# Patient Record
Sex: MALE | Race: BLACK OR AFRICAN AMERICAN
[De-identification: names, ages, dates, MRNs, and addresses within clinical notes are randomized per-mention and may not be internally consistent; named-entity substitution may affect disease eponyms.]

---

## 2018-02-22 ENCOUNTER — HOSPITAL ENCOUNTER (EMERGENCY)
Dept: HOSPITAL 72 - EMR | Age: 38
Discharge: HOME | End: 2018-02-22
Payer: COMMERCIAL

## 2018-02-22 VITALS — BODY MASS INDEX: 21.19 KG/M2 | WEIGHT: 148 LBS | HEIGHT: 70 IN

## 2018-02-22 VITALS — SYSTOLIC BLOOD PRESSURE: 132 MMHG | DIASTOLIC BLOOD PRESSURE: 58 MMHG

## 2018-02-22 VITALS — DIASTOLIC BLOOD PRESSURE: 58 MMHG | SYSTOLIC BLOOD PRESSURE: 132 MMHG

## 2018-02-22 DIAGNOSIS — S63.297A: Primary | ICD-10-CM
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
## 2018-02-22 DIAGNOSIS — S62.637A: ICD-10-CM

## 2018-02-22 PROCEDURE — 99284 EMERGENCY DEPT VISIT MOD MDM: CPT

## 2018-02-22 NOTE — DIAGNOSTIC IMAGING REPORT
Indication: Reason For Exam: PAIN

 

Technique: 3 views left hand

 

Comparison: none

 

Findings: There is posterior dislocation, by one bone width, of the fifth distal

interphalangeal joint. Small osseous fragment is seen at the anterior corner of the

base of the distal phalanx. No other fractures or dislocations. Joint spaces are

preserved

 

Impression: Positive for fifth distal and interphalangeal joint dislocation.

Associated small chip fracture off the base of the fifth distal phalanx

 

Findings discussed by phone with nurse practitioner Ba Buckley in the emergency

room at the time of interpretation

## 2018-02-22 NOTE — EMERGENCY ROOM REPORT
History of Present Illness


General


Chief Complaint:  Upper Extremity Injury


Source:  Patient


 (Ba Buckley)





Present Illness


HPI


39 yo male patient presents to ER complaining of injury to left hand small 

finger x1 hour.


Patient reports he was moving boxes and a box dropped his hand.


Patient reports deformity of small finger.


Patient reports sharp pain 10/10.


Patient denies open wound or bleeding.


Patient denies LOC, head injury.


Reports hx of chronic pain treated with Norco.


Reports has not taken any medication prior to arrival at ER.


Denies fever, chest pain, SOB.


 (Ba Buckley)


Allergies:  


Coded Allergies:  


     No Known Allergies (Unverified , 2/22/18)





Patient History


Past Medical History:  see triage record


Reviewed Nursing Documentation:  PMH: Agreed, PSxH: Agreed (Ba Buckley)





Nursing Documentation-PMH


Past Medical History:  No History, Except For


Hx Neurological Problems:  Yes


 (Ba Buckley)





Review of Systems


All Other Systems:  negative except mentioned in HPI


 (Ba Buckley)





Physical Exam





Vital Signs








  Date Time  Temp Pulse Resp B/P (MAP) Pulse Ox O2 Delivery O2 Flow Rate FiO2


 


2/22/18 16:19 97.3 85 16 132/58 99 Room Air  





 97.3       








Sp02 EP Interpretation:  reviewed, normal


General Appearance:  well appearing, alert, GCS 15, non-toxic, mild distress


Head:  normocephalic, atraumatic


Eyes:  bilateral eye normal inspection, bilateral eye PERRL


ENT:  hearing grossly normal, normal pharynx, normal voice, uvula midline, 

moist mucus membranes


Neck:  full range of motion


Respiratory:  normal inspection, lungs clear, normal breath sounds, no rhonchi, 

no accessory muscle use, no wheezing, speaking full sentences


Cardiovascular #1:  regular rate, rhythm


Cardiovascular #2:  2+ radial (R), 2+ radial (L)


Musculoskeletal:  back normal, digits/nails normal, gait/station normal, no 

calf tenderness, decreased range of motion, swelling, other - left pinky finger

: distal phalanx in hyperextension, unable to move; NVI, tender - left hand


Neurologic:  alert, oriented x3, responsive, motor strength/tone normal, normal 

gait


Psychiatric:  mood/affect normal


Skin:  no rash, other - no open wound, no break in skin


Lymphatic:  no adenopathy


 (Ba Buckley)





Procedures


Joint Reduction


Joint Reduction :  


   Consent:  Verbal


   Joint Reduction Site:  other - left hand DIP joint


   Procedural Sedation:  No - digital block with 1% lidocaine performed


   Reduction Attempts:  One


   Pre-Procedure NV Exam:  Yes


   Post-Procedure NV Exam:  Yes


   Post Joint Reduction Film:  joint reduced


   Patient Tolerated:  Well


   Complications:  None


 (Ba Buckley)





Medical Decision Making


PA Attestation


Dr. Martin is my supervising Physician whom patient management has been 

discussed with.


 (Ba Buckley)


Diagnostic Impression:  


 Primary Impression:  


 Dislocation, finger closed


 Qualified Codes:  S63.259A - Unspecified dislocation of unspecified finger, 

initial encounter


 Additional Impression:  


 Fracture, finger


 Qualified Codes:  S62.667A - Nondisplaced fracture of distal phalanx of left 

little finger, initial encounter for closed fracture


ER Course


Pt. presents to the ED c/o finger pain s/p injury.





Ddx considered but are not limited to fracture, dislocation, sprain, strain, 

contusion, cellulitis.





Vital signs: are WNL, pt. is afebrile





Significant hyperextended deformity of DIP digit on physical exam, 

hyperextension, unable to bend finger, cap refill <2 seconds.


Ordered pain medication and xray of left hand.





ER COURSE:


Provided patient with pain medication.


An X-ray of the left hand was ordered, results show fifth digit dislocation at 

DIP joint and small chip fracture at base of distal phalanx per official 

radiology report; spoke with radiologist on the phone.


Informed patient of dislocation and fracture. Discuss with patient need for 

reduction, patient provided with verbal consent to procedure.





Patient finger clean and sterilized. Digital block performed on patient using 1

% lidocaine 2 ml used. 


Reduction performed, single attempt required for reduction. Patient tolerated 

procedure well. Patient able to bend finger at DIP joint following procedure. 

The affected finger was checked afterwards by me showing good alignment and 

support with distal neurovascular functioning intact.





Post-reduction x-ray of left hand ordered. Results show successful reduction of 

dislocation. Fracture still present.





Short arm splint applied to arm following concerns from patient and wife that 

finger splint may not remain on. and protect finger. The affected finger was 

checked afterwards by me showing good alignment and support with distal 

neurovascular functioning intact.





DISCHARGE:


-Rx provided for Ibuprofen for pain symptoms. Patient reports prescription for 

Norco; will not provide patient with opioid prescription.





At this time pt. is stable for d/c to home. 


Will provide printed patient care instructions, and any necessary prescriptions.


Patient instructed to follow with primary care provider in 2-3 days and to 

request further orthopedic follow-up.


Care plan and follow up instructions have been discussed with the patient prior 

to discharge.


Patient instructed on RICE method: rest, ice, compression, elevation.


Patient instructed to NWB.


Take medications as directed. 


Patient questions asked and answered.


ER precautions given, patient instructed to return to ER immediately for any 

new or worsening of symptoms.


 (Ba Buckley)


ER Course


I supervised and assisted in the reduction of the finger.


 (Derek Martin M.D.)





Other X-Ray Diagnostic Results


Other X-Ray Diagnostic Results #1:  


   X-Ray ordered:  left hand


   # of Views/Limited Vs Complete:  3 View


   Indication:  Pain


   EP Interpretation:  Yes


   PA Xray:  Interpretation reviewed, by supervising MD, and agrees with 

findings.


   Interpretation:  no soft tissue swelling, other - Positive for fifth distal 

and interphalangeal joint dislocation.


   Impression:  No acute disease


   PA Scribe Text


Raphael Buckley PA-C


Other X-Ray Diagnostic Results #2:  


   X-Ray ordered:  left hand


   # of Views/Limited Vs Complete:  3 View


   Indication:  Other - post-reduction


   EP Interpretation:  Yes


   PA Xray:  Interpretation reviewed, by supervising MD, and agrees with 

findings.


   Interpretation:  no dislocation, no soft tissue swelling, other - Interim 

successful reduction of previously demonstrated fifth distal 


 (Ba Buckley.AAnderson)


Other X-Ray Diagnostic Results #1:  


   PA Xray:  Interpretation reviewed, by supervising MD, and agrees with 

findings. - Derek Martin MD


   Electronically Signed by:  I reviewed these films


Other X-Ray Diagnostic Results #2:  


   PA Xray:  Interpretation reviewed, by supervising MD, and agrees with 

findings. - Derek Martin MD


   Electronically Signed by:  I reviewed these films


 (Derek Martin M.D.)





Last Vital Signs








  Date Time  Temp Pulse Resp B/P (MAP) Pulse Ox O2 Delivery O2 Flow Rate FiO2


 


2/22/18 16:31 97.3       


 


2/22/18 16:19  85 16 132/58 99 Room Air  








 (Ba Buckley P.AAnderson)


Status:  improved


 (Derek Martin M.D.)


Disposition:  HOME, SELF-CARE


Condition:  Improved


Scripts


Ibuprofen* (MOTRIN*) 600 Mg Tablet


600 MG ORAL Q8H Y for For Pain, #30 TAB 0 Refills


   Prov: Ba Buckley         2/22/18


Referrals:  


[facility],REFERRING (PCP)


Patient Instructions:  Finger Fracture, Easy-to-Read, Finger or Thumb 

Dislocation, Easy-to-Read





Additional Instructions:  


Patient instructed to follow up with primary care provider and discuss further 

referral to orthopedics.


Patient instructed on RICE method: rest, ice, compression, elevation.


Patient instructed to NWB.


Take medications as directed. 


Patient questions asked and answered.


ER precautions given, patient instructed to return to ER immediately for any 

new or worsening of symptoms.











Ba Buckley Feb 22, 2018 17:37


Derek Martin M.D. Feb 25, 2018 02:37

## 2018-02-23 NOTE — DIAGNOSTIC IMAGING REPORT
Indication: Left hand pain, status post reduction of dislocation

 

Technique: 3 views left hand

 

Comparison: One hour earlier

 

Findings: Previously demonstrated fifth distal interphalangeal joint dislocation has

been reduced, with satisfactory anatomic alignment. Small fracture fragment is again

seen projected at the anterior aspect of the base of the fifth distal phalanx on the

lateral and oblique views. No other acute fractures. No dislocations.

 

Impression: Interim successful reduction of previously demonstrated fifth distal

interphalangeal joint dislocation. Associated small anterior corner fracture again

demonstrated